# Patient Record
Sex: MALE | Race: WHITE | Employment: OTHER | URBAN - METROPOLITAN AREA
[De-identification: names, ages, dates, MRNs, and addresses within clinical notes are randomized per-mention and may not be internally consistent; named-entity substitution may affect disease eponyms.]

---

## 2021-01-26 NOTE — PROGRESS NOTES
[address] [facility]. Damien Penn M.D., F.A.C.P. Hakeem Gutierrez, YOVANI, APRN, CNS  Caleb Patel. Vianey Amaya, MSN, APRN-FNP-C  Anil Montes De Oca MSN, APRN, FNP-C  Syed MCDOWELL PA-C  [address] MSN, APRN, FNP-C  [address] [place] [address]  [place], [address]  Phone: [phone]  Fax: [phone]       Ortiz Castelan is a 45 y.o. 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[facility]     HLD (hyperlipidemia)     Obesity     [facility]      Past Surgical History:       No past surgery    Allergies:       No Known Allergies    Medications:     Current Outpatient Medications   Medication Sig Dispense Refill    atorvastatin (LIPITOR) 80 MG tablet Take 1 tablet by mouth daily      JARDIANCE 10 MG tablet Take 1 tablet by mouth daily      lisinopril (PRINIVIL;ZESTRIL) 10 MG tablet Take 1 tablet by mouth daily      metFORMIN (GLUCOPHAGE) 500 MG tablet Take 2 tablets by mouth every morning And 1 tab every evening.  vitamin D (ERGOCALCIFEROL) 1.25 MG (49515 UT) CAPS capsule Take 1 capsule by mouth once a week      warfarin (COUMADIN) 5 MG tablet Take by mouth 1 1/2 tabs daily Monday-Saturday and 1 tab on Sunday. No current facility-administered medications for this visit. Social History:       He is  with 2 children  He does not work    No history of tobacco or EtOH but he does intermittently smoke marijuana    Review of Systems:     No chest pain or palpitations  No SOB  No vertigo, lightheadedness or loss of consciousness  No falls, tripping or stumbling  No incontinence of bowels or bladder  No itching or bruising appreciated  + L sided weakness  + slurred speech  + balance issues    ROS is otherwise negative    Family History:     Family History   Problem Relation Age of Onset    Stroke Maternal Grandmother      History of Present Illness:      The patient was referred by TANYA Ferguson for history of stroke    He presents alone and is a good historian, with a significant past medical history of recurrent strokes, factor VII disorder, HLD, diabetes, obesity. He tells me he has suffered from \"16 strokes\" in the past 10 years or more. The first stroke consisted of the acute onset of left-sided weakness and paresthesia. He had a second event 3 years later, with the same symptoms, as well as his left eye \"shooting out to the left\" and dry heaving/dizziness. He recovered fairly well from those insults, but apparently had multiple other similar events over the past several years--more suggestive of TIAs. He was at [facility] in 2005, 2011, and 2012. No discs to review, but radiologist from MRI done in 2012 makes note of a left frontal lobe white matter focus on diffusion imaging, felt to be artifact, as well as remote lacunar strokes, particularly in the left basal ganglia. No major intracranial athero or occlusive disease mentioned on reports. He states his last stroke was about 2 to 3 years ago, and his eye had the same symptoms as before, but he completely lost hearing in his left ear. He was living in [place] at that time and was found to have factor VII deficiency. He was told that this was the cause of his previous strokes, and he was placed on warfarin. I have no records from [place] to review. He recently moved back here to establish care. Since he has been initiated on anticoagulation, he has had no recurrence of his events. He is also on high intensity Lipitor. He denies any history of A. fib and no known family history of clotting disorders. He has residual slurred speech and some left-sided weakness and spasticity. He also has some balance issues. No further paresthesias, and no swallowing troubles. He also has uncontrolled diabetes, and states he thinks his recent A1c was around 9.   He has attended diabetes education classes in the past.  He describes a lack of motivation to exercise or take care of himself as well as decreased energy overall. Objective:     /86 (Site: Left Upper Arm, Position: Sitting, Cuff Size: Large Adult)   Pulse 101   Temp 96.8 °F (36 °C) (Infrared)   Resp 12   Ht 5' 9\" (1.753 m)   Wt (!) 345 lb (156.5 kg)   SpO2 96%   BMI 50.95 kg/m²     General appearance: alert, appears stated age, cooperative and in no distress  Head: normocephalic, without obvious abnormality, atraumatic  Eyes: conjunctivae/corneas clear; no drainage  Neck: no carotid bruit; full ROM   Back: symmetric, no curvature.  ROM normal  Lungs: clear to auscultation bilaterally  Heart: regular rate and rhythm--no murmur  Abdomen: soft, non-tender; bowel sounds normal; no masses,  no organomegaly  Extremities: normal, atraumatic, no cyanosis or edema  Pulses: 2+ and symmetric  Skin:  color, texture, turgor normal--no rashes or lesions      Mental Status: alert and oriented x 4--appears depressed    Appropriate attention/concentration  Intact fundus of knowledge  Memories intact    Speech: mild dysarthria--slightly monotone voice  Language: no aphasias    Cranial Nerves:  I: smell    II: visual acuity     II: visual fields Full    II: pupils NINA   III,VII: ptosis None   III,IV,VI: extraocular muscles  EOMI without nystagmus   V: mastication Normal   V: facial light touch sensation  Normal   V,VII: corneal reflex     VII: facial muscle function - upper  Normal   VII: facial muscle function - lower Normal   VIII: hearing Normal   IX: soft palate elevation  Normal   IX,X: gag reflex    XI: trapezius strength  5/5   XI: sternocleidomastoid strength 5/5   XI: neck extension strength  5/5   XII: tongue strength  Normal     Motor:  5/5 throughout  Mild L sided spasticity  Obese bulk   No drift   No abnormal movements    Sensory:  LT and PP normal  Vibration normal    Coordination:   FN, FFM and CANDACE normal    Gait:  Mild L spastic hemiparetic gait DTR:   Right Brachioradialis reflex BE  Left Brachioradialis reflex 1+  Right Biceps reflex 0  Left Biceps reflex 1+  Right Triceps reflex 0  Left Triceps reflex 1+  Right Quadriceps reflex 1+  Left Quadriceps reflex 1+  Right Achilles reflex 0  Left Achilles reflex 0    No Antonio's    No other pathological reflexes    Laboratory/Radiology:  ry/Radiology:     Labs reviewed in referral note under media tab  Remarkable for:  LDL 33  Vitamin D 10.5    MRI brain [facility] 2005: not available in [employer] for personal review. Focal nonenhancing lesions in the deep left basal ganglia /internal capsule region and para-vermian region of the superiorright cerebellum. Differential considerations would include but are not limited to old inflammatory lesions or demyelinating  lesions. Small neoplastic lesions are felt to be less likely. Clinical correlation is essential.2. Developmental venous anomaly (DVA) traversing the rightfrontal white matter. 3. Remainder of the MRI brain is unremarkable. 4. Mild mucosal thickening in the paranasal sinuses as noted. CT head and CTA neck 2011 [facility]: not available in [employer] for personal review. Low density within the right basal ganglia as described. 2.  Infarct versus perivascular space -- MRI would be of value. 3.  No intracranial hemorrhage. 4.  Normal intracranial -- extracranial CTA. 5.  No hemodynamically significant pathology. MRI brain and MRA head/neck 2012 [facility]: not available in [employer] for personal review. LIMITED MRI BRAIN WITH AND MRAS EXAM.NO DEFINITE ACUTE ABNORMALITY. LEFT FRONTAL LOBE WHITE MATTER FOCUS ON DIFFUSION IMAGING LIKELY ARTIFACT. [facility]. CHRONIC SMALL VESSEL ISCHEMIC CHANGE WITH REMOTE LACUNAR INFARCTS. INTERVAL BUT  REMOTE LEFT CORPUS STRATUM/BASAL GANGLIA LACUNAR INFARCT. INTERVAL EVOLUTION OF THE PREVIOUS LEFT BASAL GANGLIA/CORONA RADIATA INFARCT. GROSS PATENCY OF THE MAJOR ARTERIES EXCEPT.  LEFT POSTERIOR CEREBRAL ARTERY DIMINISHED FLOW, POSSIBLY ARTIFACTUAL     All labs and images were personally reviewed at the time of this visit    Assessment:     Hx recurrent strokes/TIAs: presumably secondary to hypercoagulable state from Factor VII, but will need to clarify with review of records and scans from [place]. No documented hx of afib. Now anticoagulated, with no recurrence of infarctions. He does have a mild left spastic hemiparesis and dysarthria, residual from these infarcts. 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 Annabel Dues He may benefit from SSRI    Uncontrolled diabetes    Factor VII disorder    Morbid obesity    Plan:     Start Zoloft 25 mg daily    Obtain MRI images and records from [address] [place], [place] [address]e [facility] [address] [place] [address] and statin per PCP    Control DM, lose weight, begin exercise    Mod other vasc risk factors; stroke s/s reviewed    RTO in 3 mos or sooner TANYA Morales  12:18 PM  1/26/2021    I spent 35 minutes with this patient obtaining the HPI and discussing the exam with greater than 50% of the time providing counseling and education on medications and other treatment plans. All questions were answered prior to leaving my office.

## 2021-01-27 ENCOUNTER — OFFICE VISIT (OUTPATIENT)
Dept: NEUROLOGY | Age: 51
End: 2021-01-27
Payer: COMMERCIAL

## 2021-01-27 VITALS
TEMPERATURE: 96.8 F | HEART RATE: 101 BPM | RESPIRATION RATE: 12 BRPM | WEIGHT: 315 LBS | OXYGEN SATURATION: 96 % | BODY MASS INDEX: 46.65 KG/M2 | DIASTOLIC BLOOD PRESSURE: 86 MMHG | HEIGHT: 69 IN | SYSTOLIC BLOOD PRESSURE: 115 MMHG

## 2021-01-27 DIAGNOSIS — I63.9 RECURRENT STROKES (HCC): Primary | ICD-10-CM

## 2021-01-27 PROCEDURE — 99204 OFFICE O/P NEW MOD 45 MIN: CPT | Performed by: NURSE PRACTITIONER

## 2021-01-27 RX ORDER — ATORVASTATIN CALCIUM 80 MG/1
1 TABLET, FILM COATED ORAL DAILY
COMMUNITY
Start: 2021-01-20

## 2021-01-27 RX ORDER — SERTRALINE HYDROCHLORIDE 25 MG/1
25 TABLET, FILM COATED ORAL DAILY
Qty: 90 TABLET | Refills: 3 | Status: SHIPPED
Start: 2021-01-27 | End: 2021-12-17

## 2021-01-27 RX ORDER — LISINOPRIL 10 MG/1
1 TABLET ORAL DAILY
COMMUNITY
Start: 2021-01-19

## 2021-01-27 RX ORDER — EMPAGLIFLOZIN 10 MG/1
1 TABLET, FILM COATED ORAL DAILY
COMMUNITY
Start: 2021-01-20

## 2021-01-27 RX ORDER — ERGOCALCIFEROL 1.25 MG/1
1 CAPSULE ORAL WEEKLY
COMMUNITY
Start: 2021-01-20

## 2021-01-27 RX ORDER — WARFARIN SODIUM 5 MG/1
TABLET ORAL
COMMUNITY
Start: 2021-01-20

## 2021-01-27 SDOH — HEALTH STABILITY: MENTAL HEALTH: HOW MANY STANDARD DRINKS CONTAINING ALCOHOL DO YOU HAVE ON A TYPICAL DAY?: NOT ASKED

## 2021-01-27 NOTE — PATIENT INSTRUCTIONS
Patient Education        sertraline  Pronunciation:  SER tra lizzy  Brand:  Zoloft  What is the most important information I should know about sertraline? You should not use sertraline if you also take pimozide, or if you are being treated with methylene blue injection. Do not use this medicine if you have used an MAO inhibitor in the past 14 days, such as isocarboxazid, linezolid, methylene blue injection, phenelzine, rasagiline, selegiline, or tranylcypromine. Some young people have thoughts about suicide when first taking an antidepressant. Stay alert to changes in your mood or symptoms. Report any new or worsening symptoms to your doctor. Seek medical attention right away if you have symptoms of serotonin syndrome, such as: agitation, hallucinations, fever, sweating, shivering, fast heart rate, muscle stiffness, twitching, loss of coordination, nausea, vomiting, or diarrhea. What is sertraline? Sertraline is an antidepressant in a group of drugs called selective serotonin reuptake inhibitors (SSRIs). Sertraline affects chemicals in the brain that may be unbalanced in people with depression, panic, anxiety, or obsessive-compulsive symptoms. Sertraline is used to treat depression, obsessive-compulsive disorder, panic disorder, anxiety disorders, post-traumatic stress disorder (PTSD), and premenstrual dysphoric disorder (PMDD). Sertraline may also be used for purposes not listed in this medication guide. What should I discuss with my healthcare provider before taking sertraline? You should not use sertraline if you are allergic to it, or if you also take pimozide.  Do not use the liquid form of sertraline  if you are taking disulfiram (Antabuse) or you could have a severe reaction to the disulfiram. Do not take sertraline within 14 days before or 14 days after you take an MAO inhibitor. A dangerous drug interaction could occur. MAO inhibitors include isocarboxazid, linezolid, phenelzine, rasagiline, selegiline, and tranylcypromine. To make sure sertraline is safe for you, tell your doctor if you have ever had:  · heart disease, high blood pressure, or a stroke;  · liver or kidney disease;  · a seizure;  · bleeding problems, or if you take warfarin (Coumadin, Jantoven);  · bipolar disorder (manic depression); or  · low levels of sodium in your blood. Some medicines can interact with sertraline and cause a serious condition called serotonin syndrome. Be sure your doctor knows if you also take stimulant medicine, opioid medicine, herbal products, other antidepressants, or medicine for mental illness, Parkinson's disease, migraine headaches, serious infections, or prevention of nausea and vomiting. Ask your doctor before making any changes in how or when you take your medications. Some young people have thoughts about suicide when first taking an antidepressant. Your doctor should check your progress at regular visits. Your family or other caregivers should also be alert to changes in your mood or symptoms. Taking an SSRI antidepressant during pregnancy may cause serious lung problems or other complications in the baby. However, you may have a relapse of depression if you stop taking your antidepressant. Tell your doctor right away if you become pregnant. Do not start or stop taking this medicine during pregnancy without your doctor's advice. It is not known whether sertraline passes into breast milk or if it could harm a nursing baby. Tell your doctor if you are breast-feeding a baby. Do not give sertraline to anyone younger than 25years old without the advice of a doctor. Sertraline is FDA-approved for children with obsessive-compulsive disorder (OCD). It is not approved for treating depression in children. How should I take sertraline? Follow all directions on your prescription label. Your doctor may occasionally change your dose. Do not take this medicine in larger or smaller amounts or for longer than recommended. Sertraline may be taken with or without food. Try to take the medicine at the same time each day. The liquid (oral concentrate) form of sertraline must be diluted before you take it. To be sure you get the correct dose, measure the liquid with the medicine dropper provided. Mix the dose with 4 ounces (one-half cup) of water, ginger ale, lemon/lime soda, lemonade, or orange juice. Do not use any other liquids to dilute the medicine. Stir this mixture and drink all of it right away. To make sure you get the entire dose, add a little more water to the same glass, swirl gently and drink right away. This medicine can cause you to have a false positive drug screening test. If you provide a urine sample for drug screening, tell the laboratory staff that you are taking sertraline. It may take up to 4 weeks before your symptoms improve. Keep using the medication as directed and tell your doctor if your symptoms do not improve. Do not stop using sertraline suddenly, or you could have unpleasant withdrawal symptoms. Ask your doctor how to safely stop using sertraline. Store at room temperature away from moisture and heat. What happens if I miss a dose? Take the missed dose as soon as you remember. Skip the missed dose if it is almost time for your next scheduled dose. Do not take extra medicine to make up the missed dose. What happens if I overdose? Seek emergency medical attention or call the Poison Help line at 1-376.862.4096. What should I avoid while taking sertraline? Do not drink alcohol. Ask your doctor before taking a nonsteroidal anti-inflammatory drug (NSAID) for pain, arthritis, fever, or swelling. This includes aspirin, ibuprofen (Advil, Motrin), naproxen (Aleve), celecoxib (Celebrex), diclofenac, indomethacin, meloxicam, and others. Using an NSAID with sertraline may cause you to bruise or bleed easily. This medication may impair your thinking or reactions. Be careful if you drive or do anything that requires you to be alert. What are the possible side effects of sertraline? Get emergency medical help if you have signs of an allergic reaction: skin rash or hives (with or without fever or joint pain); difficulty breathing; swelling of your face, lips, tongue, or throat. Report any new or worsening symptoms to your doctor, such as: mood or behavior changes, anxiety, panic attacks, trouble sleeping, or if you feel impulsive, irritable, agitated, hostile, aggressive, restless, hyperactive (mentally or physically), more depressed, or have thoughts about suicide or hurting yourself. Call your doctor at once if you have:  · a seizure (convulsions);  · blurred vision, tunnel vision, eye pain or swelling;  · low levels of sodium in the body --headache, confusion, memory problems, severe weakness, feeling unsteady; or  · manic episodes --racing thoughts, increased energy, unusual risk-taking behavior, extreme happiness, being irritable or talkative. Seek medical attention right away if you have symptoms of serotonin syndrome, such as: agitation, hallucinations, fever, sweating, shivering, fast heart rate, muscle stiffness, twitching, loss of coordination, nausea, vomiting, or diarrhea. Common side effects may include:  · drowsiness, tiredness, feeling anxious or agitated;  · indigestion, nausea, diarrhea, loss of appetite;  · sweating;  · tremors or shaking;  · sleep problems (insomnia); or  · decreased sex drive, impotence, or difficulty having an orgasm. This is not a complete list of side effects and others may occur. Call your doctor for medical advice about side effects. You may report side effects to FDA at 2-402-POJ-0749. What other drugs will affect sertraline? Taking sertraline with other drugs that make you sleepy can worsen this effect. Ask your doctor before taking a sleeping pill, narcotic medication, muscle relaxer, or medicine for anxiety, depression, or seizures. Other drugs may interact with sertraline, including prescription and over-the-counter medicines, vitamins, and herbal products. Tell your doctor about all your current medicines and any medicine you start or stop using. Where can I get more information? Your pharmacist can provide more information about sertraline. Remember, keep this and all other medicines out of the reach of children, never share your medicines with others, and use this medication only for the indication prescribed. Every effort has been made to ensure that the information provided by [address] is accurate, up-to-date, and complete, but no guarantee is made to that effect. Drug information contained herein may be time sensitive. [facility] information has been compiled for use by healthcare practitioners and consumers in the [place] and therefore [facility] does not warrant that uses outside of the [place] are appropriate, unless specifically indicated otherwise. [facility]'s drug information does not endorse drugs, diagnose patients or recommend therapy. [facility]'s drug information is an informational resource designed to assist licensed healthcare practitioners in caring for their patients and/or to serve consumers viewing this service as a supplement to, and not a substitute for, the expertise, skill, knowledge and judgment of healthcare practitioners. The absence of a warning for a given drug or drug combination in no way should be construed to indicate that the drug or drug combination is safe, effective or appropriate for any given patient. [facility] does not assume any responsibility for any aspect of healthcare administered with the aid of information [facility] provides. The information contained herein is not intended to cover all possible uses, directions, precautions, warnings, drug interactions, allergic reactions, or adverse effects. If you have questions about the drugs you are taking, check with your doctor, nurse or pharmacist.  Copyright 9072-9534 [address]. Version: 21.01. Revision date: 8/9/2017. Care instructions adapted under license by [facility] ([facility]). If you have questions about a medical condition or this instruction, always ask your healthcare professional. [place] [address] any warranty or liability for your use of this information. Patient Education        Learning About FAST: Stroke Warning Signs  What is FAST? FAST is a simple way to remember the main symptoms of stroke. Recognizing these symptoms helps you know when to call for medical help. FAST stands for:  · F ace drooping. · A rm weakness. · S peech difficulty. · T fan to call 911. Other stroke symptoms can include sudden confusion, a severe headache, and problems with vision or balance. What happens when you have a stroke? A stroke occurs when a blood vessel to the brain bursts or is blocked by a blood clot. Within minutes, the nerve cells in that part of the brain die. As a result, the part of the body controlled by those cells cannot work properly. The effects of a stroke may range from mild to severe. They may get better, or they may last the rest of your life. A stroke can affect vision, speech, behavior, thought processes, and your ability to move. It can cause symptoms that may include:  · Sudden numbness, tingling, weakness, or loss of movement in your face, arm, or leg, especially on only one side of your body. · Sudden vision changes. · Sudden trouble speaking. · Sudden confusion or trouble understanding simple statements. · Sudden problems with walking or balance. · A sudden, severe headache that is different from past headaches. Why is it important to get help FAST? Quick treatment may save your life. And it may reduce the damage in your brain so that you have fewer problems after the stroke. When you know stroke symptoms, you will know when it's important to call for medical help. Where can you learn more? Go to [email or web address].eesuleiman.makeena. org and sign in to your [employer] account. Enter L919 in the [facility] box to learn more about \"Learning About FAST: Stroke Warning Signs. \"     If you do not have an account, please click on the \"Sign Up Now\" link. Current as of: March 4, 2020               Content Version: 12.6  © 8887-2252 [employer]. Care instructions adapted under license by [facility] ([facility]). If you have questions about a medical condition or this instruction, always ask your healthcare professional. [address] any warranty or liability for your use of this information.

## 2021-01-27 NOTE — Clinical Note
Please send note to Dinesh Murphy, the referring provider. He is also obtain records and neuroimaging discs from [facility] in [place], [place] if able. Please also obtain MRI brain from [facility] 2012 if able.  Cannot see it in Star system

## 2021-03-30 ENCOUNTER — TELEPHONE (OUTPATIENT)
Dept: NEUROLOGY | Age: 51
End: 2021-03-30

## 2021-03-30 NOTE — TELEPHONE ENCOUNTER
----- Message from AMAIRANI Manley CNP sent at 1/27/2021  3:00 PM EST -----  Please send note to Angie Ash, the referring provider. He is also obtain records and neuroimaging discs from [facility] in [place], [place] if able. Please also obtain MRI brain from [facility] 2012 if able.  Cannot see it in Star system

## 2021-03-30 NOTE — TELEPHONE ENCOUNTER
Record request faxed to [facility] for 10/11-10/13/17 admission. MRIs also requested from 2012 at [facility].   Electronically signed by Sujey King on 3/30/21 at 4:32 PM EDT

## 2021-04-08 ENCOUNTER — CLINICAL DOCUMENTATION (OUTPATIENT)
Dept: NEUROLOGY | Age: 51
End: 2021-04-08

## 2021-04-08 NOTE — PROGRESS NOTES
Reviewed records from [facility] neurology in [place]. It appears that coagulation studies were done at that time but pending at the time of discharge and not available in these records.

## 2021-08-25 ENCOUNTER — TELEPHONE (OUTPATIENT)
Dept: FAMILY MEDICINE CLINIC | Age: 51
End: 2021-08-25

## 2021-08-25 NOTE — TELEPHONE ENCOUNTER
----- Message from Vesta Duron sent at 8/25/2021 10:55 AM EDT -----  Subject: Referral Request    QUESTIONS   Reason for referral request? std labs   Has the physician seen you for this condition before? No   Preferred Specialist (if applicable)? Do you already have an appointment scheduled? Yes  Select Scheduled Date? 2021-09-09  Select Scheduled Physician? Iwona Huddleston   Additional Information for Provider? pt would like all std labs, pt asked   of some one is able to call him for the address closer to his appt date  ---------------------------------------------------------------------------  --------------  CALL BACK INFO  What is the best way for the office to contact you? OK to leave message on   voicemail  Preferred Call Back Phone Number?  [phone]

## 2021-08-25 NOTE — TELEPHONE ENCOUNTER
Spoke to doctor and she wants to see the pt before she orders any blood work.   Talked to pt and told him doctor wants to see him first.

## 2021-10-31 ENCOUNTER — HOSPITAL ENCOUNTER (EMERGENCY)
Age: 51
Discharge: HOME | End: 2021-10-31
Attending: EMERGENCY MEDICINE
Payer: MEDICARE

## 2021-10-31 VITALS
DIASTOLIC BLOOD PRESSURE: 78 MMHG | TEMPERATURE: 97.6 F | WEIGHT: 315 LBS | BODY MASS INDEX: 51.69 KG/M2 | HEART RATE: 96 BPM | RESPIRATION RATE: 18 BRPM | OXYGEN SATURATION: 95 % | SYSTOLIC BLOOD PRESSURE: 122 MMHG

## 2021-10-31 DIAGNOSIS — M79.605 BILATERAL LEG PAIN: Primary | ICD-10-CM

## 2021-10-31 DIAGNOSIS — M79.604 BILATERAL LEG PAIN: Primary | ICD-10-CM

## 2021-10-31 PROCEDURE — 99283 EMERGENCY DEPT VISIT LOW MDM: CPT

## 2021-10-31 PROCEDURE — 96372 THER/PROPH/DIAG INJ SC/IM: CPT

## 2021-10-31 PROCEDURE — 6370000000 HC RX 637 (ALT 250 FOR IP): Performed by: EMERGENCY MEDICINE

## 2021-10-31 PROCEDURE — 6360000002 HC RX W HCPCS: Performed by: EMERGENCY MEDICINE

## 2021-10-31 RX ORDER — ACETAMINOPHEN 500 MG
1000 TABLET ORAL ONCE
Status: COMPLETED | OUTPATIENT
Start: 2021-10-31 | End: 2021-10-31

## 2021-10-31 RX ORDER — LIDOCAINE 4 G/G
1 PATCH TOPICAL DAILY
Qty: 30 PATCH | Refills: 0 | Status: SHIPPED | OUTPATIENT
Start: 2021-10-31 | End: 2021-11-30

## 2021-10-31 RX ORDER — KETOROLAC TROMETHAMINE 30 MG/ML
30 INJECTION, SOLUTION INTRAMUSCULAR; INTRAVENOUS ONCE
Status: COMPLETED | OUTPATIENT
Start: 2021-10-31 | End: 2021-10-31

## 2021-10-31 RX ORDER — NAPROXEN 500 MG/1
500 TABLET ORAL 2 TIMES DAILY PRN
Qty: 28 TABLET | Refills: 0 | Status: SHIPPED | OUTPATIENT
Start: 2021-10-31 | End: 2021-11-14

## 2021-10-31 RX ADMIN — ACETAMINOPHEN 1000 MG: 500 TABLET ORAL at 08:43

## 2021-10-31 RX ADMIN — KETOROLAC TROMETHAMINE 30 MG: 30 INJECTION, SOLUTION INTRAMUSCULAR at 08:42

## 2021-10-31 ASSESSMENT — ENCOUNTER SYMPTOMS
COLOR CHANGE: 0
ABDOMINAL PAIN: 0
DIARRHEA: 0
TROUBLE SWALLOWING: 0
SHORTNESS OF BREATH: 0
NAUSEA: 0
VOMITING: 0
COUGH: 0
BLOOD IN STOOL: 0
RHINORRHEA: 0

## 2021-10-31 ASSESSMENT — PAIN DESCRIPTION - LOCATION: LOCATION: LEG

## 2021-10-31 ASSESSMENT — PAIN SCALES - GENERAL
PAINLEVEL_OUTOF10: 3
PAINLEVEL_OUTOF10: 4
PAINLEVEL_OUTOF10: 4

## 2021-10-31 ASSESSMENT — PAIN DESCRIPTION - PAIN TYPE: TYPE: ACUTE PAIN

## 2021-10-31 NOTE — ED PROVIDER NOTES
ED PROVIDER NOTE    Chief Complaint   Patient presents with    Leg Pain     left upper leg on and off for a while        HPI:  10/31/21,   Time: 8:36 AM EDT       Neetu Lee is a 45 y.o. male presenting to the ED for bilateral leg pain. Ongoing intermittently chronically but significantly worse over the past 1-2 days. Throbbing pain from bilateral hip to calf, worse laying down at night, improved during the day. No associated back pain, numbness, weakness. No saddle anesthesia, hx of IVDU, changes in bowel/bladder function, night sweats, fever, weight loss, or recent spinal instrumentation. 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 States his INR was a little high 2d ago. No difficulty ambulating. No recent illness, fever, chills, nausea, vomiting, diarrhea, chest pain, cough, abdominal pain. Normal po intake and urine output. Chart review: hx of DM, HLD, CVA, factor VII deficiency on warfarin    Review of Systems:     Review of Systems   Constitutional: Negative for appetite change, chills and fever. HENT: Negative for congestion, rhinorrhea and trouble swallowing. Eyes: Negative for visual disturbance. Respiratory: Negative for cough and shortness of breath. Cardiovascular: Negative for chest pain and leg swelling. Gastrointestinal: Negative for abdominal pain, blood in stool, diarrhea, nausea and vomiting. Genitourinary: Negative for decreased urine volume, difficulty urinating, dysuria, frequency, hematuria and urgency. Musculoskeletal: Positive for myalgias. Negative for neck pain and neck stiffness. Skin: Negative for color change.    Neurological: Negative for dizziness, syncope, weakness, light-headedness, numbness and headaches.         --------------------------------------------- PAST HISTORY ---------------------------------------------  Past Medical History:   Past Medical History:   Diagnosis Date    Diabetes ([facility]     Factor VII deficiency ([facility]  HLD (hyperlipidemia)     Obesity     Recurrent strokes (HCC)        Past Surgical History:   History reviewed. No pertinent surgical history. Social History:   Social History     Socioeconomic History    Marital status: Single     Spouse name: None    Number of children: None    Years of education: None    Highest education level: None   Occupational History    None   Tobacco Use    Smoking status: Never Smoker    Smokeless tobacco: Never Used   Vaping Use    Vaping Use: Never used   Substance and Sexual Activity    Alcohol use: Never    Drug use: Never    Sexual activity: None   Other Topics Concern    None   Social History Narrative    None     Social Determinants of Health     Financial Resource Strain:     Difficulty of Paying Living Expenses:    Food Insecurity:     Worried About Running Out of Food in the Last Year:     Ran Out of Food in the Last Year:    Transportation Needs:     Lack of Transportation (Medical):  Lack of Transportation (Non-Medical):    Physical Activity:     Days of Exercise per Week:     Minutes of Exercise per Session:    Stress:     Feeling of Stress :    Social Connections:     Frequency of Communication with Friends and Family:     Frequency of Social Gatherings with Friends and Family:     Attends [employer] Services:     Active Member of Clubs or Organizations:     Attends Club or Organization Meetings:     Marital Status:    Intimate Partner Violence:     Fear of Current or Ex-Partner:     Emotionally Abused:     Physically Abused:     Sexually Abused:        Family History:   Family History   Problem Relation Age of Onset    Stroke Maternal Grandmother        The patients home medications have been reviewed.     Allergies:   No Known Allergies        ---------------------------------------------------PHYSICAL EXAM--------------------------------------    /78   Pulse 96   Temp 97.6 °F (36.4 °C) (Temporal)   Resp 18   Wt (!) 350 lb (158.8 kg)   SpO2 95%   BMI 51.69 kg/m²     Physical Exam  Vitals and nursing note reviewed. Constitutional:       General: He is not in acute distress. Appearance: He is not toxic-appearing. HENT:      Mouth/Throat:      Mouth: Mucous membranes are moist.   Eyes:      General: No scleral icterus. Extraocular Movements: Extraocular movements intact. Pupils: Pupils are equal, round, and reactive to light. Cardiovascular:      Rate and Rhythm: Normal rate and regular rhythm. Pulses: Normal pulses. Heart sounds: Normal heart sounds. No murmur heard. Pulmonary:      Effort: Pulmonary effort is normal. No respiratory distress. Breath sounds: Normal breath sounds. No wheezing or rales. Abdominal:      General: There is no distension. Palpations: Abdomen is soft. Tenderness: There is no abdominal tenderness. There is no guarding or rebound. Musculoskeletal:         General: No swelling or tenderness. Normal range of motion. Cervical back: Normal range of motion and neck supple. No rigidity or tenderness. No muscular tenderness. Comments: No midline TLS spinal ttp/stepoff/deformity. Mild ttp along bilateral thighs. No edema. Radial, DP, and PT pulses 2+ bilaterally. Skin:     General: Skin is warm and dry. Neurological:      Mental Status: He is alert and oriented to person, place, and time. Comments: Strength 5/5 and sensation grossly intact to light touch and equal bilaterally throughout all extremities               ------------------------- NURSING NOTES AND VITALS REVIEWED ---------------------------   The nursing notes within the ED encounter and vital signs as below have been reviewed by myself.   /78   Pulse 96   Temp 97.6 °F (36.4 °C) (Temporal)   Resp 18   Wt (!) 350 lb (158.8 kg)   SpO2 95%   BMI 51.69 kg/m²   Oxygen Saturation Interpretation: Normal    The patients available past medical records and past encounters were reviewed. ------------------------------ ED COURSE/MEDICAL DECISION MAKING----------------------  Medications   ketorolac (TORADOL) injection 30 mg (has no administration in time range)   acetaminophen (TYLENOL) tablet 1,000 mg (has no administration in time range)     Counseling: The emergency provider has spoken with the patient and discussed todays results, in addition to providing specific details for the plan of care and counseling regarding the diagnosis and prognosis. Questions are answered at this time and they are agreeable with the plan. ED Course/Medical Decision Makin y.o. male here with bilateral leg pain, acute on chronic. Non-toxic appearing, afebrile, hemodynamically stable, and in no acute distress. BLE Neurovascularly intact. No recent injury. On warfarin w/ recent INR slightly supratherapeutic. Exam reassuring, low suspicion for acute spinal/epidural pathology. Pain improving during time in ED. Given positional nature suspect possible spinal stenosis. After discussion of findings and return precautions, patient agrees with plan for discharge and outpatient follow up with PCP. Rx NSAIDs and lidocaine patch.       --------------------------------- IMPRESSION AND DISPOSITION ---------------------------------    IMPRESSION  1. Bilateral leg pain        DISPOSITION  Disposition: Discharge to home  Patient condition is good    NOTE: This report was transcribed using voice recognition software.  Every effort was made to ensure accuracy; however, inadvertent computerized transcription errors may be present    Dario Spivey MD  Attending Emergency Physician         Dario Spivey MD  10/31/21 [id number]

## 2021-12-17 RX ORDER — SERTRALINE HYDROCHLORIDE 25 MG/1
TABLET, FILM COATED ORAL
Qty: 30 TABLET | Refills: 3 | Status: SHIPPED | OUTPATIENT
Start: 2021-12-17

## 2021-12-17 NOTE — TELEPHONE ENCOUNTER
Called patient. Voicemail box not set up, unable to leave a message.     Electronically signed by Chris Camargo MA on 12/17/2021 at 2:03 PM

## 2022-06-20 RX ORDER — SERTRALINE HYDROCHLORIDE 25 MG/1
TABLET, FILM COATED ORAL
Qty: 30 TABLET | Refills: 10 | OUTPATIENT
Start: 2022-06-20

## 2022-06-20 NOTE — TELEPHONE ENCOUNTER
Not seen for over a year. Please see last phone encounter.  Needs appt before this will be filled or he can get refills from PCP

## 2022-06-28 RX ORDER — SERTRALINE HYDROCHLORIDE 25 MG/1
TABLET, FILM COATED ORAL
Qty: 30 TABLET | Refills: 10 | OUTPATIENT
Start: 2022-06-28

## 2022-06-28 NOTE — TELEPHONE ENCOUNTER
Needs follow up before more refills will be given. Not seen since Jan 2021.  He can call his PCP for refills until then

## 2022-07-01 RX ORDER — SERTRALINE HYDROCHLORIDE 25 MG/1
TABLET, FILM COATED ORAL
Qty: 30 TABLET | Refills: 10 | OUTPATIENT
Start: 2022-07-01

## 2022-08-02 RX ORDER — SERTRALINE HYDROCHLORIDE 25 MG/1
TABLET, FILM COATED ORAL
Qty: 30 TABLET | Refills: 10 | OUTPATIENT
Start: 2022-08-02

## 2022-08-31 RX ORDER — SERTRALINE HYDROCHLORIDE 25 MG/1
TABLET, FILM COATED ORAL
Qty: 30 TABLET | Refills: 10 | OUTPATIENT
Start: 2022-08-31

## 2022-09-28 RX ORDER — SERTRALINE HYDROCHLORIDE 25 MG/1
TABLET, FILM COATED ORAL
Qty: 30 TABLET | Refills: 10 | OUTPATIENT
Start: 2022-09-28